# Patient Record
Sex: MALE | Race: WHITE | Employment: FULL TIME | URBAN - METROPOLITAN AREA
[De-identification: names, ages, dates, MRNs, and addresses within clinical notes are randomized per-mention and may not be internally consistent; named-entity substitution may affect disease eponyms.]

---

## 2022-10-15 ENCOUNTER — HOSPITAL ENCOUNTER (EMERGENCY)
Age: 37
Discharge: HOME | End: 2022-10-15
Attending: EMERGENCY MEDICINE
Payer: COMMERCIAL

## 2022-10-15 ENCOUNTER — APPOINTMENT (OUTPATIENT)
Dept: GENERAL RADIOLOGY | Age: 37
End: 2022-10-15
Attending: EMERGENCY MEDICINE
Payer: COMMERCIAL

## 2022-10-15 VITALS
WEIGHT: 180 LBS | DIASTOLIC BLOOD PRESSURE: 95 MMHG | SYSTOLIC BLOOD PRESSURE: 151 MMHG | BODY MASS INDEX: 28.93 KG/M2 | OXYGEN SATURATION: 100 % | TEMPERATURE: 98.8 F | RESPIRATION RATE: 18 BRPM | HEIGHT: 66 IN | HEART RATE: 107 BPM

## 2022-10-15 DIAGNOSIS — L03.115 CELLULITIS OF RIGHT LEG: Primary | ICD-10-CM

## 2022-10-15 LAB
ANION GAP SERPL CALC-SCNC: 6 MMOL/L (ref 5–15)
BASOPHILS # BLD: 0.1 K/UL (ref 0–0.1)
BASOPHILS NFR BLD: 1 % (ref 0–1)
BUN SERPL-MCNC: 10 MG/DL (ref 6–20)
BUN/CREAT SERPL: 10 (ref 12–20)
CALCIUM SERPL-MCNC: 8.6 MG/DL (ref 8.5–10.1)
CHLORIDE SERPL-SCNC: 101 MMOL/L (ref 97–108)
CK SERPL-CCNC: 125 U/L (ref 39–308)
CO2 SERPL-SCNC: 33 MMOL/L (ref 21–32)
CREAT SERPL-MCNC: 0.97 MG/DL (ref 0.7–1.3)
DIFFERENTIAL METHOD BLD: NORMAL
EOSINOPHIL # BLD: 0.3 K/UL (ref 0–0.4)
EOSINOPHIL NFR BLD: 3 % (ref 0–7)
ERYTHROCYTE [DISTWIDTH] IN BLOOD BY AUTOMATED COUNT: 13.2 % (ref 11.5–14.5)
GLUCOSE SERPL-MCNC: 95 MG/DL (ref 65–100)
HCT VFR BLD AUTO: 41.2 % (ref 36.6–50.3)
HGB BLD-MCNC: 13 G/DL (ref 12.1–17)
IMM GRANULOCYTES # BLD AUTO: 0 K/UL (ref 0–0.04)
IMM GRANULOCYTES NFR BLD AUTO: 0 % (ref 0–0.5)
LACTATE BLD-SCNC: 1.72 MMOL/L (ref 0.4–2)
LYMPHOCYTES # BLD: 2.2 K/UL (ref 0.8–3.5)
LYMPHOCYTES NFR BLD: 22 % (ref 12–49)
MCH RBC QN AUTO: 27.2 PG (ref 26–34)
MCHC RBC AUTO-ENTMCNC: 31.6 G/DL (ref 30–36.5)
MCV RBC AUTO: 86.2 FL (ref 80–99)
MONOCYTES # BLD: 0.7 K/UL (ref 0–1)
MONOCYTES NFR BLD: 7 % (ref 5–13)
NEUTS SEG # BLD: 6.8 K/UL (ref 1.8–8)
NEUTS SEG NFR BLD: 67 % (ref 32–75)
NRBC # BLD: 0 K/UL (ref 0–0.01)
NRBC BLD-RTO: 0 PER 100 WBC
PLATELET # BLD AUTO: 284 K/UL (ref 150–400)
PMV BLD AUTO: 8.9 FL (ref 8.9–12.9)
POTASSIUM SERPL-SCNC: 3.7 MMOL/L (ref 3.5–5.1)
RBC # BLD AUTO: 4.78 M/UL (ref 4.1–5.7)
SODIUM SERPL-SCNC: 140 MMOL/L (ref 136–145)
WBC # BLD AUTO: 10.2 K/UL (ref 4.1–11.1)

## 2022-10-15 PROCEDURE — 99284 EMERGENCY DEPT VISIT MOD MDM: CPT

## 2022-10-15 PROCEDURE — 90714 TD VACC NO PRESV 7 YRS+ IM: CPT | Performed by: EMERGENCY MEDICINE

## 2022-10-15 PROCEDURE — 96374 THER/PROPH/DIAG INJ IV PUSH: CPT

## 2022-10-15 PROCEDURE — 96361 HYDRATE IV INFUSION ADD-ON: CPT

## 2022-10-15 PROCEDURE — 82550 ASSAY OF CK (CPK): CPT

## 2022-10-15 PROCEDURE — 74011250636 HC RX REV CODE- 250/636: Performed by: EMERGENCY MEDICINE

## 2022-10-15 PROCEDURE — 85025 COMPLETE CBC W/AUTO DIFF WBC: CPT

## 2022-10-15 PROCEDURE — 73590 X-RAY EXAM OF LOWER LEG: CPT

## 2022-10-15 PROCEDURE — 90471 IMMUNIZATION ADMIN: CPT

## 2022-10-15 PROCEDURE — 74011250637 HC RX REV CODE- 250/637: Performed by: EMERGENCY MEDICINE

## 2022-10-15 PROCEDURE — 36415 COLL VENOUS BLD VENIPUNCTURE: CPT

## 2022-10-15 PROCEDURE — 83605 ASSAY OF LACTIC ACID: CPT

## 2022-10-15 PROCEDURE — 80048 BASIC METABOLIC PNL TOTAL CA: CPT

## 2022-10-15 RX ORDER — KETOROLAC TROMETHAMINE 30 MG/ML
30 INJECTION, SOLUTION INTRAMUSCULAR; INTRAVENOUS
Status: COMPLETED | OUTPATIENT
Start: 2022-10-15 | End: 2022-10-15

## 2022-10-15 RX ORDER — IBUPROFEN 600 MG/1
600 TABLET ORAL
Qty: 20 TABLET | Refills: 0 | Status: SHIPPED | OUTPATIENT
Start: 2022-10-15

## 2022-10-15 RX ORDER — IBUPROFEN 200 MG
800 TABLET ORAL
COMMUNITY

## 2022-10-15 RX ORDER — DOXYCYCLINE HYCLATE 100 MG
100 TABLET ORAL 2 TIMES DAILY
Qty: 14 TABLET | Refills: 0 | Status: SHIPPED | OUTPATIENT
Start: 2022-10-15 | End: 2022-10-22

## 2022-10-15 RX ORDER — DOXYCYCLINE HYCLATE 100 MG
100 TABLET ORAL ONCE
Status: COMPLETED | OUTPATIENT
Start: 2022-10-15 | End: 2022-10-15

## 2022-10-15 RX ORDER — ALBUTEROL SULFATE 90 UG/1
2 AEROSOL, METERED RESPIRATORY (INHALATION)
COMMUNITY

## 2022-10-15 RX ADMIN — SODIUM CHLORIDE 1000 ML: 9 INJECTION, SOLUTION INTRAVENOUS at 19:45

## 2022-10-15 RX ADMIN — TETANUS AND DIPHTHERIA TOXOIDS ADSORBED 0.5 ML: 2; 2 INJECTION INTRAMUSCULAR at 19:43

## 2022-10-15 RX ADMIN — KETOROLAC TROMETHAMINE 30 MG: 30 INJECTION, SOLUTION INTRAMUSCULAR at 19:44

## 2022-10-15 RX ADMIN — DOXYCYCLINE HYCLATE 100 MG: 100 TABLET, COATED ORAL at 20:36

## 2022-10-15 NOTE — ED PROVIDER NOTES
37M w/ hx asthma p/w right leg pain and swelling x4d. Pt reports that symptoms started after he was moving and scrapped his right lower leg against a piece of metal on the truck. Since then having right lower leg redness, pain and swelling. He points to a wound to his right lower leg where he was injured. Has had cellulitis before to his abd wall. No weakness/numbness and has been able to stand and bear wt. No FC, cough, dyspnea, chest pain. Left leg unaffected. No drugs/etoh including IVDA. Not a diabetic. Past Medical History:   Diagnosis Date    Asthma        History reviewed. No pertinent surgical history. History reviewed. No pertinent family history. Social History     Socioeconomic History    Marital status: SINGLE     Spouse name: Not on file    Number of children: Not on file    Years of education: Not on file    Highest education level: Not on file   Occupational History    Not on file   Tobacco Use    Smoking status: Every Day     Packs/day: 0.25     Types: Cigarettes    Smokeless tobacco: Never   Substance and Sexual Activity    Alcohol use: Never    Drug use: Never    Sexual activity: Not on file   Other Topics Concern    Not on file   Social History Narrative    Not on file     Social Determinants of Health     Financial Resource Strain: Not on file   Food Insecurity: Not on file   Transportation Needs: Not on file   Physical Activity: Not on file   Stress: Not on file   Social Connections: Not on file   Intimate Partner Violence: Not on file   Housing Stability: Not on file         ALLERGIES: Patient has no known allergies. Review of Systems   Constitutional:  Negative for chills, diaphoresis and fever. HENT:  Negative for facial swelling, mouth sores, nosebleeds, trouble swallowing and voice change. Eyes:  Negative for pain and visual disturbance. Respiratory:  Negative for apnea, cough, shortness of breath, wheezing and stridor.     Cardiovascular:  Negative for chest pain, palpitations and leg swelling. Gastrointestinal:  Negative for abdominal distention, abdominal pain, blood in stool, diarrhea, nausea and vomiting. Genitourinary:  Negative for difficulty urinating, dysuria, flank pain, hematuria, scrotal swelling and testicular pain. Musculoskeletal:  Positive for myalgias. Negative for joint swelling. Skin:  Positive for rash and wound. Negative for color change. Allergic/Immunologic: Negative for immunocompromised state. Neurological:  Negative for dizziness, syncope and light-headedness. Hematological:  Does not bruise/bleed easily. Psychiatric/Behavioral:  Negative for agitation and behavioral problems. Vitals:    10/15/22 1749   BP: (!) 151/95   Pulse: (!) 117   Resp: 18   Temp: 98.8 °F (37.1 °C)   SpO2: 100%   Weight: 81.6 kg (180 lb)   Height: 5' 5.5\" (1.664 m)            Physical Exam  Vitals and nursing note reviewed. Constitutional:       General: He is not in acute distress. Appearance: Normal appearance. He is not ill-appearing or toxic-appearing. HENT:      Head: Normocephalic and atraumatic. Right Ear: External ear normal.      Left Ear: External ear normal.      Nose: Nose normal.      Mouth/Throat:      Mouth: Mucous membranes are moist.      Pharynx: Oropharynx is clear. No oropharyngeal exudate or posterior oropharyngeal erythema. Eyes:      General: No scleral icterus. Extraocular Movements: Extraocular movements intact. Conjunctiva/sclera: Conjunctivae normal.      Pupils: Pupils are equal, round, and reactive to light. Cardiovascular:      Rate and Rhythm: Regular rhythm. Tachycardia present. Pulses: Normal pulses. Heart sounds: No murmur heard. No friction rub. No gallop. Pulmonary:      Effort: Pulmonary effort is normal. No respiratory distress. Breath sounds: Normal breath sounds. No stridor. No wheezing, rhonchi or rales.    Abdominal:      General: Bowel sounds are normal. There is no distension. Palpations: Abdomen is soft. Tenderness: There is no abdominal tenderness. There is no guarding or rebound. Musculoskeletal:         General: Normal range of motion. Cervical back: Normal range of motion and neck supple. No rigidity. Left lower leg: No edema. Comments: Right lower leg edematous w/ noncircumferential erythema, not crossing knee or ankle joint which are also w/o effusion and have full ROM  Small wound to right lateral lower leg w/ scab  Mild tenderness to palpation, no pain out of proportion, compartments soft  BLE motor/sensory and distal pulses equal/intact   Skin:     General: Skin is warm. Capillary Refill: Capillary refill takes less than 2 seconds. Coloration: Skin is not jaundiced. Neurological:      General: No focal deficit present. Mental Status: He is alert. Cranial Nerves: No cranial nerve deficit. Sensory: No sensory deficit. Motor: No weakness. Coordination: Coordination normal.   Psychiatric:         Mood and Affect: Mood normal.         Behavior: Behavior normal.         Thought Content: Thought content normal.         Judgment: Judgment normal.      LABORATORY TESTS:  Admission on 10/15/2022, Discharged on 10/15/2022   Component Date Value Ref Range Status    CK 10/15/2022 125  39 - 308 U/L Final    Sodium 10/15/2022 140  136 - 145 mmol/L Final    Potassium 10/15/2022 3.7  3.5 - 5.1 mmol/L Final    Chloride 10/15/2022 101  97 - 108 mmol/L Final    CO2 10/15/2022 33 (A)  21 - 32 mmol/L Final    Anion gap 10/15/2022 6  5 - 15 mmol/L Final    Glucose 10/15/2022 95  65 - 100 mg/dL Final    BUN 10/15/2022 10  6 - 20 MG/DL Final    Creatinine 10/15/2022 0.97  0.70 - 1.30 MG/DL Final    BUN/Creatinine ratio 10/15/2022 10 (A)  12 - 20   Final    eGFR 10/15/2022 >60  >60 ml/min/1.73m2 Final    Comment:      Pediatric calculator link: [employer]. [employer]/professionals/kdoqi/gfr_calculatorped       Effective Oct 3, 2022       These results are not intended for use in patients <25years of age. eGFR results are calculated without a race factor using  the 2021 CKD-EPI equation. Careful clinical correlation is recommended, particularly when comparing to results calculated using previous equations. The CKD-EPI equation is less accurate in patients with extremes of muscle mass, extra-renal metabolism of creatinine, excessive creatine ingestion, or following therapy that affects renal tubular secretion. Calcium 10/15/2022 8.6  8.5 - 10.1 MG/DL Final    WBC 10/15/2022 10.2  4.1 - 11.1 K/uL Final    RBC 10/15/2022 4.78  4.10 - 5.70 M/uL Final    HGB 10/15/2022 13.0  12.1 - 17.0 g/dL Final    HCT 10/15/2022 41.2  36.6 - 50.3 % Final    MCV 10/15/2022 86.2  80.0 - 99.0 FL Final    MCH 10/15/2022 27.2  26.0 - 34.0 PG Final    MCHC 10/15/2022 31.6  30.0 - 36.5 g/dL Final    RDW 10/15/2022 13.2  11.5 - 14.5 % Final    PLATELET 34/74/9404 980  150 - 400 K/uL Final    MPV 10/15/2022 8.9  8.9 - 12.9 FL Final    NRBC 10/15/2022 0.0  0.0  WBC Final    ABSOLUTE NRBC 10/15/2022 0.00  0.00 - 0.01 K/uL Final    NEUTROPHILS 10/15/2022 67  32 - 75 % Final    LYMPHOCYTES 10/15/2022 22  12 - 49 % Final    MONOCYTES 10/15/2022 7  5 - 13 % Final    EOSINOPHILS 10/15/2022 3  0 - 7 % Final    BASOPHILS 10/15/2022 1  0 - 1 % Final    IMMATURE GRANULOCYTES 10/15/2022 0  0 - 0.5 % Final    ABS. NEUTROPHILS 10/15/2022 6.8  1.8 - 8.0 K/UL Final    ABS. LYMPHOCYTES 10/15/2022 2.2  0.8 - 3.5 K/UL Final    ABS. MONOCYTES 10/15/2022 0.7  0.0 - 1.0 K/UL Final    ABS. EOSINOPHILS 10/15/2022 0.3  0.0 - 0.4 K/UL Final    ABS. BASOPHILS 10/15/2022 0.1  0.0 - 0.1 K/UL Final    ABS. IMM. GRANS. 10/15/2022 0.0  0.00 - 0.04 K/UL Final    DF 10/15/2022 AUTOMATED    Final    Lactic Acid (POC) 10/15/2022 1.72  0.40 - 2.00 mmol/L Final       IMAGING RESULTS:  XR TIB/FIB RT   Final Result   No acute abnormality.           MEDICATIONS GIVEN:  Medications sodium chloride 0.9 % bolus infusion 1,000 mL (0 mL IntraVENous IV Completed 10/15/22 2030)   ketorolac (TORADOL) injection 30 mg (30 mg IntraVENous Given 10/15/22 1944)   tetanus-diphtheria toxoids-Td (Td) 2-2 Lf unit/0.5 mL injection 0.5 mL (0.5 mL IntraMUSCular Given 10/15/22 1943)   doxycycline (VIBRA-TABS) tablet 100 mg (100 mg Oral Given 10/15/22 2036)       PROGRESS NOTE:   10:12 PM Patient's symptoms have improved after treatment    CONSULTS:  none    IMPRESSION:  1. Cellulitis of right leg        PLAN:  - Discharge    Padma Vivar MD      MDM  Number of Diagnoses or Management Options  Cellulitis of right leg  Diagnosis management comments: 37M w/ hx asthma p/w right leg pain, redness and swelling x4d. Pt nontoxic appearing, afebrile, tachy but stable BP. BLE motor/sensory and distal pulses intact. Right lower leg w/ erythema and edema and healing wound. Labs unremarkable including normal CK , lactate and no leukocytosis. Xrays neg for fx, dislocation or gas in soft tissues. Likely LE cellulitis. Low clinical concern for NSTI. Gave IVF w/ improvement in HR and toradol for pain. Updated tetanus. Started on 7d doxy. Patient given specific return precautions and explained signs/symptoms for which to come back to ED immediately but otherwise advised to f/u w/ PCP over next 2-3days.        Amount and/or Complexity of Data Reviewed  Clinical lab tests: ordered and reviewed  Tests in the radiology section of CPT®: ordered and reviewed  Tests in the medicine section of CPT®: reviewed and ordered    Risk of Complications, Morbidity, and/or Mortality  Presenting problems: moderate  Diagnostic procedures: moderate  Management options: moderate           Procedures

## 2022-10-15 NOTE — ED TRIAGE NOTES
Pt ambulates to treatment area he states that this past Tuesday he was moving and he scraped the outer side his right lower leg when he fell off the back of the trailer  It now is more swollen and with some redness to the area. He denies any fevers. He has crusted yellow drainage at the base of the scrape.

## 2022-10-16 NOTE — ED NOTES
The patient was discharged home by Dr Chelsie Bolden and Shahriar Doty RN in stable condition . The patient is alert and oriented, is in no respiratory distress and has vital signs within normal limits . The patient's diagnosis, condition and treatment were explained to patient or parent/guardian. The patient/responsible party expressed understanding. Two prescriptions given to pt. No work/school note given to pt. A discharge plan has been developed. A  was not involved in the process. Aftercare instructions were given to the patient. Saline lock removed without complication.